# Patient Record
Sex: FEMALE | Race: WHITE | NOT HISPANIC OR LATINO | Employment: FULL TIME | URBAN - METROPOLITAN AREA
[De-identification: names, ages, dates, MRNs, and addresses within clinical notes are randomized per-mention and may not be internally consistent; named-entity substitution may affect disease eponyms.]

---
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
## 2023-01-07 NOTE — PROGRESS NOTES
[facility] Hospital Medicine Progress Note        Chief Complaint: Inpatient Follow-up for Severe sepsis     HPI:   Lizzy Vanegas is a 17 y.o. female with a PMHx of congenital heart diease with PPM in place who presented to [facility] on 8/25/22 with c/o a generalized body rash. She was seen in the ED a few days prior for fevers, work-up at that time was unremarkable and she was discharged home. Work-up done on 8/25 revealing CXR with mild central vascular congestion. Labs were notable for WBC 3.99, hgb 7.3, hct 24.3, platelets 69. Total bili 1.4, , , alk phos 171, sodium 130, potassium 3, chloride 96, lactic acid 3.2, INR 1.3, PTT 35.9, troponin 0.086 with repeat 0.087. UDS positive for THC. She was transfused 1 unit PRBC. Given IV Vancomycin for a possible UTI. She was transferred to [facility] for a higher level of care.      VS upon arrival include BP 89/54, , RR 20, SpO2 100%, temp 99.9F. CXR with pulmonary interstitial edema. Labs repeated and notable for WBC 3.9, hgb 7.4, hct 24.6, platelets 71, sodium 134, CO2 19, BUN 20, alk phos 164, , , troponin 0.160. Flu and COVID negative. Abdominal US is pending. She is very anxious. Reports she has been having fevers intermittently x1 week. She then developed a non-pruritic rash x1 day ago to the trunk, bilateral feet and her back which has since resolved. She denied any abdominal pain, N/V, diarrhea, dysuria. She endorsed fever, chills, body aches, CP and SOB. She reports that she is on Enalapril and Lasix at home, dosages unknown. She vapes occasionally and smokes marijuana regularly.   Interval Hx:   Patient awake but lethargic. Has persistent cough. No sputum. Afebrile today. Moderate appetite. Informed her about her pneumonia and she started to cry. Reassured that she is on the right treatment plan.     Objective/physical exam:  General: In no acute distress, febrile, lethargic, frail   Oral  mucosa moist   Chest: Clear to auscultation bilaterally  Heart: RRR, +S1, S2, no appreciable murmur  Abdomen: Soft, nontender, BS +  MSK: Warm, no lower extremity edema, no clubbing or cyanosis, spine no point tenderness   Neurologic: Cranial nerve II-XII intact, Strength 5/5 in all 4 extremities    VITAL SIGNS: 24 HRS MIN & MAX LAST   Temp  Min: 97.7 °F (36.5 °C)  Max: 101.9 °F (38.8 °C) 97.7 °F (36.5 °C)   BP  Min: 86/49  Max: 110/67 (!) 96/59     Pulse  Min: 79  Max: 106  83   Resp  Min: 14  Max: 18 18   SpO2  Min: 92 %  Max: 100 % 95 %         Recent Labs   Lab 08/26/22  1349 08/27/22  0452 08/28/22  0600   WBC 3.9* 5.4 7.2   RBC 3.90* 4.18* 4.23   HGB 7.4* 8.7* 8.8*   HCT 24.6* 29.1* 28.8*   MCV 63.1* 69.6* 68.1*   MCH 19.0* 20.8* 20.8*   MCHC 30.1* 29.9* 30.6*   RDW 21.2* 25.7* 25.3*   PLT 71* 65* 57*       Recent Labs   Lab 08/26/22  1349 08/27/22  0452 08/27/22  1150 08/28/22  1019   * 133*  --  135*   K 3.5 3.3*  --  3.7   CO2 19* 17*  --  20*   BUN 20.0* 12.9  --  7.6   CREATININE 0.69 0.64  --  0.54*   CALCIUM 8.2* 7.5*  --  7.6*   PH  --   --  7.46*  --    ALBUMIN 2.4* 2.3*  --  2.2*   ALKPHOS 164* 155*  --  147   * 78*  --  63*   * 89*  --  83*   BILITOT 1.5 1.8*  --  2.1*          Microbiology Results (last 7 days)       Procedure Component Value Units Date/Time    Urine culture [[id number]] Collected: 08/26/22 1800    Order Status: Completed Specimen: Urine Updated: 08/28/22 0857     Urine Culture No Growth    Blood Culture [[id number]]  (Normal) Collected: 08/26/22 1756    Order Status: Completed Specimen: Blood from Arm Updated: 08/27/22 2002     CULTURE, BLOOD ([facility]) No Growth At 24 Hours    Blood Culture [[id number]]  (Normal) Collected: 08/26/22 1756    Order Status: Completed Specimen: Blood from Arm Updated: 08/27/22 2002     CULTURE, BLOOD ([facility]) No Growth At 24 Hours             See below for Radiology    Scheduled Med:   folic acid  1 mg Oral Daily    piperacillin-tazobactam  (ZOSYN) IVPB  4.5 g Intravenous Q8H    vancomycin (VANCOCIN) IVPB  750 mg Intravenous Q8H        Continuous Infusions:   dextrose 5 % and 0.45 % NaCl with KCl 20 mEq 75 mL/hr at 08/28/22 0126        PRN Meds:  sodium chloride, acetaminophen, acetaminophen, albuterol sulfate, ALPRAZolam, benzonatate, diphenhydrAMINE, HYDROcodone-acetaminophen, ondansetron, Pharmacy to dose Vancomycin consult **AND** vancomycin - pharmacy to dose       Assessment/Plan:  Severe Sepsis secondary to patrick pneumonia   Hypotension secondary to early septic shock: Improving   Mild Elevated Troponin, likely NSTEMI Type 2  Pancytopenia - Microcytic Anemia, Thrombocytopenia, Leukopenia from sepsis   Transaminitis  Metabolic Acidosis   Hx of Congential Heart Diease s/p PPM placement  Tobacco Use  Marijuana Use  Anxiety    Plan:  Patient is more alert compared to yesterday. Has persistent dry cough  CT chest results reviewed  Will cont IV Vanc + Zosyn   BP still low. Will cont iv fluids   Will closely monitor patients daily weight, urine out put, renal parameters and volume status    Cont Tele monitoring   F/U on cultures   Cont D5 1/2 NS with bicarb at 70 cc/hr    Pancytopenia could be from sepsis, Seen by hematology and now patient on folic acid   Avoiding heparin/lovenox for now     HIV negative, UDS + THC    F/U on CSF reports form the other hospital     Labs for autoimmune disorders have also been started. Will f/u on results when available    Labs in am    Critical care note:  Critical care diagnosis: Sepsis needing iv antibiotics   Critical care interventions: Hands-on evaluation, review of labs/radiographs/records and discussion with patient and family if present  Critical care time spent: 45 minutes                VTE prophylaxis: SCDs    Patient condition: Guarded    Anticipated discharge and Disposition:         All diagnosis and differential diagnosis have been reviewed; assessment and plan has been documented; I have personally  reviewed the labs and test results that are presently available; I have reviewed the patients medication list; I have reviewed the consulting providers response and recommendations. I have reviewed or attempted to review medical records based upon their availability    All of the patient's questions have been  addressed and answered. Patient's is agreeable to the above stated plan. I will continue to monitor closely and make adjustments to medical management as needed.  _____________________________________________________________________    Nutrition Status:    Radiology:  CTA Chest Non-Coronary(PE Studies)  Narrative: EXAMINATION:  CTA CHEST NON CORONARY    CLINICAL HISTORY:  Pulmonary embolism (PE) suspected, high prob;    TECHNIQUE:  Low dose axial images, sagittal and coronal reformations were obtained from the thoracic inlet to the lung bases following the IV administration of contrast..  Contrast timing was optimized to evaluate the pulmonary arteries.  MIP images were performed.  Automated exposure control was utilized    Comparison: None.    Clinical History: Lizzy Vanegas is a 17 y.o female with a PMHx of congenital heart diease with PPM in place who presented to [facility] on 8/25/22 with c/o a generalized body rash.  She was seen in the ED a few days prior for fevers, work-up at that time was unremarkable and she was discharged home.  Work-up done on 8/25 revealing CXR with mild central vascular congestion. Labs were notable for WBC 3.99, hgb 7.3, hct 24.3, platelets 69. Total bili 1.4, , , alk phos.    Findings:    Soft Tissues: Unremarkable.    Axilla: A few mildly prominent lymph nodes are seen in the right and left axilla.    Neck: The visualized soft tissues of the neck appear unremarkable.    Mediastinum: A few prominent and enlarged mediastinal lymph nodes are seen. These may be reactive in nature.    Heart: The heart size is within normal limits. A permanent pacemaker is  also seen.    Aorta: Unremarkable appearing aorta. No aortic dissection or aneurysm is seen.    Pulmonary Arteries: No filling defects are seen in the pulmonary arteries to suggest pulmonary embolus.    Lungs: Multiple areas of consolidation and ground glass densities are seen in the bilateral lungs with some interlobular septal thickening in the bases. This is consistent with multifocal pneumonia with an element of concomitant pulmonary edema not excluded. There are also multiple linear opacities in the bilateral lungs, which may reflect scarring or atelectasis.    Pleura: There are small bilateral right greater than left pleural effusions.    Bony Structures: The visualized bony structures appear unremarkable.    Ribs: The visualized bilateral ribs appear unremarkable.    Abdomen: There is a 2 cm focus of contrast enhancement in the posterior aspect of the right hepatic lobe (series 5 images 68 to 77).  Impression: Impression:    1. No filling defects are seen in the pulmonary arteries to suggest pulmonary embolus.    2. Multiple areas of consolidation and ground glass densities are seen in the bilateral lungs with some interlobular septal thickening in the bases. This is consistent with multifocal pneumonia with an element of concomitant pulmonary edema not excluded. Correlate with clinical and laboratory findings and recommend continued serial interval follow-up to resolution.    3. There is a 2 cm focus of contrast enhancement in the posterior aspect of the right hepatic lobe (series 5 images 68 to 77). Correlate clinically as regards additional evaluation and follow-up.    4. Details and other findings as discussed above.    I concur with the preliminary report    Electronically signed by: Gillian Juarez  Date:    08/28/2022  Time:    09:49      Dre Turk MD   08/28/2022                 Heterosexual